# Patient Record
Sex: FEMALE | Race: OTHER | HISPANIC OR LATINO | URBAN - METROPOLITAN AREA
[De-identification: names, ages, dates, MRNs, and addresses within clinical notes are randomized per-mention and may not be internally consistent; named-entity substitution may affect disease eponyms.]

---

## 2017-07-10 ENCOUNTER — EMERGENCY (EMERGENCY)
Facility: HOSPITAL | Age: 42
LOS: 1 days | Discharge: HOME | End: 2017-07-10
Attending: EMERGENCY MEDICINE | Admitting: EMERGENCY MEDICINE
Payer: MEDICAID

## 2017-07-10 VITALS
OXYGEN SATURATION: 96 % | SYSTOLIC BLOOD PRESSURE: 112 MMHG | RESPIRATION RATE: 16 BRPM | TEMPERATURE: 98 F | HEART RATE: 86 BPM | DIASTOLIC BLOOD PRESSURE: 69 MMHG

## 2017-07-10 VITALS
TEMPERATURE: 98 F | SYSTOLIC BLOOD PRESSURE: 107 MMHG | DIASTOLIC BLOOD PRESSURE: 76 MMHG | RESPIRATION RATE: 16 BRPM | HEART RATE: 72 BPM | OXYGEN SATURATION: 99 %

## 2017-07-10 DIAGNOSIS — Z88.5 ALLERGY STATUS TO NARCOTIC AGENT: ICD-10-CM

## 2017-07-10 DIAGNOSIS — J45.909 UNSPECIFIED ASTHMA, UNCOMPLICATED: ICD-10-CM

## 2017-07-10 DIAGNOSIS — Z79.899 OTHER LONG TERM (CURRENT) DRUG THERAPY: ICD-10-CM

## 2017-07-10 PROCEDURE — 99284 EMERGENCY DEPT VISIT MOD MDM: CPT

## 2017-07-10 RX ORDER — ALBUTEROL 90 UG/1
2 AEROSOL, METERED ORAL
Qty: 1 | Refills: 0 | OUTPATIENT
Start: 2017-07-10 | End: 2017-08-09

## 2017-07-10 RX ORDER — ALBUTEROL 90 UG/1
3 AEROSOL, METERED ORAL
Qty: 60 | Refills: 0 | OUTPATIENT
Start: 2017-07-10 | End: 2017-08-09

## 2017-07-10 RX ORDER — ALBUTEROL 90 UG/1
2.5 AEROSOL, METERED ORAL ONCE
Qty: 0 | Refills: 0 | Status: COMPLETED | OUTPATIENT
Start: 2017-07-10 | End: 2017-07-10

## 2017-07-10 RX ORDER — IPRATROPIUM/ALBUTEROL SULFATE 18-103MCG
3 AEROSOL WITH ADAPTER (GRAM) INHALATION
Qty: 0 | Refills: 0 | Status: COMPLETED | OUTPATIENT
Start: 2017-07-10 | End: 2017-07-10

## 2017-07-10 RX ADMIN — Medication 3 MILLILITER(S): at 09:43

## 2017-07-10 RX ADMIN — Medication 20 MILLIGRAM(S): at 09:34

## 2017-07-10 RX ADMIN — Medication 3 MILLILITER(S): at 09:36

## 2017-07-10 RX ADMIN — ALBUTEROL 2.5 MILLIGRAM(S): 90 AEROSOL, METERED ORAL at 10:41

## 2017-07-10 RX ADMIN — ALBUTEROL 2.5 MILLIGRAM(S): 90 AEROSOL, METERED ORAL at 11:12

## 2017-07-10 RX ADMIN — Medication 3 MILLILITER(S): at 09:38

## 2017-07-10 NOTE — ED PROVIDER NOTE - MEDICAL DECISION MAKING DETAILS
mild asthma exacerbation, pt with significantly improved res sx, lungs CTA no wheezing, ambulatory without return of wheezing, stable for dc home

## 2017-07-10 NOTE — ED PROVIDER NOTE - OBJECTIVE STATEMENT
41 y.o. female with asthma exacerbation after dust exposure/cleaning, c/o wheezing and SOB, took prednisone 20mg PTA in ED. 41 y.o. female with asthma exacerbation after dust exposure/cleaning, c/o wheezing and SOB, took prednisone 20mg PTA in ED. Denies fever, chills, palpitations, diaphoresis, DURAN, PND, exertional sx, orthopnea, cough, hemoptysis, peripheral edema, focal weakness, numbness, tingling, paresthesia, HA, dizziness, neck pain, N/V/D/C, abdominal pain, change in urinary/bowel function, trauma, fall, rash, and malaise.

## 2017-07-10 NOTE — ED PROVIDER NOTE - PROGRESS NOTE DETAILS
pt with significantly improved res sx, lungs CTA no wheezing, ambulatory without return of wheezing, stable for dc home

## 2017-10-25 ENCOUNTER — EMERGENCY (EMERGENCY)
Facility: HOSPITAL | Age: 42
LOS: 1 days | Discharge: HOME | End: 2017-10-25
Attending: EMERGENCY MEDICINE | Admitting: EMERGENCY MEDICINE
Payer: MEDICAID

## 2017-10-25 VITALS
OXYGEN SATURATION: 98 % | RESPIRATION RATE: 16 BRPM | TEMPERATURE: 98 F | HEART RATE: 77 BPM | DIASTOLIC BLOOD PRESSURE: 76 MMHG | SYSTOLIC BLOOD PRESSURE: 119 MMHG

## 2017-10-25 DIAGNOSIS — J40 BRONCHITIS, NOT SPECIFIED AS ACUTE OR CHRONIC: ICD-10-CM

## 2017-10-25 DIAGNOSIS — J45.909 UNSPECIFIED ASTHMA, UNCOMPLICATED: ICD-10-CM

## 2017-10-25 DIAGNOSIS — Z79.51 LONG TERM (CURRENT) USE OF INHALED STEROIDS: ICD-10-CM

## 2017-10-25 DIAGNOSIS — Z79.899 OTHER LONG TERM (CURRENT) DRUG THERAPY: ICD-10-CM

## 2017-10-25 DIAGNOSIS — J45.901 UNSPECIFIED ASTHMA WITH (ACUTE) EXACERBATION: ICD-10-CM

## 2017-10-25 PROCEDURE — 99284 EMERGENCY DEPT VISIT MOD MDM: CPT

## 2017-10-25 RX ORDER — IPRATROPIUM/ALBUTEROL SULFATE 18-103MCG
3 AEROSOL WITH ADAPTER (GRAM) INHALATION ONCE
Qty: 0 | Refills: 0 | Status: COMPLETED | OUTPATIENT
Start: 2017-10-25 | End: 2017-10-25

## 2017-10-25 RX ORDER — FLUTICASONE PROPIONATE AND SALMETEROL 50; 250 UG/1; UG/1
1 POWDER ORAL; RESPIRATORY (INHALATION)
Qty: 1 | Refills: 0 | OUTPATIENT
Start: 2017-10-25 | End: 2017-11-24

## 2017-10-25 RX ORDER — AZITHROMYCIN 500 MG/1
500 TABLET, FILM COATED ORAL ONCE
Qty: 0 | Refills: 0 | Status: COMPLETED | OUTPATIENT
Start: 2017-10-25 | End: 2017-10-25

## 2017-10-25 RX ORDER — AZITHROMYCIN 500 MG/1
1 TABLET, FILM COATED ORAL
Qty: 3 | Refills: 0 | OUTPATIENT
Start: 2017-10-25 | End: 2017-10-28

## 2017-10-25 RX ORDER — ALBUTEROL 90 UG/1
2 AEROSOL, METERED ORAL
Qty: 1 | Refills: 0 | OUTPATIENT
Start: 2017-10-25 | End: 2017-11-24

## 2017-10-25 RX ORDER — ALBUTEROL 90 UG/1
1 AEROSOL, METERED ORAL ONCE
Qty: 0 | Refills: 0 | Status: COMPLETED | OUTPATIENT
Start: 2017-10-25 | End: 2017-10-25

## 2017-10-25 RX ADMIN — AZITHROMYCIN 500 MILLIGRAM(S): 500 TABLET, FILM COATED ORAL at 15:34

## 2017-10-25 RX ADMIN — Medication 40 MILLIGRAM(S): at 15:34

## 2017-10-25 RX ADMIN — Medication 3 MILLILITER(S): at 15:34

## 2017-10-25 NOTE — ED PROVIDER NOTE - OBJECTIVE STATEMENT
43 yo F with a hx of asthma presents BIBEMES called by workplace for possible asthma exacerbation today. Pt states that she has been having cough with sputum for the past 2 days. Notes that her daughter recently recovered from bronchitis. Pt states that she was at work today with cough, SOB and nasal congestion. Pt was unsure if her asthma was acting up or if she was sick. Notes that she ran out of her inhaler and is currently in between insurances. Has not been able to get another inhaler. Workplace called EMS as precaution. No fever, no chills, no CP

## 2017-10-25 NOTE — ED ADULT NURSE NOTE - CHPI ED SYMPTOMS NEG
no chills/no body aches/no chest pain/no fever/no headache/no edema/no diaphoresis/no cough/no hemoptysis

## 2017-10-25 NOTE — ED ADULT TRIAGE NOTE - CHIEF COMPLAINT QUOTE
Pt with complaints of asthma exacerbation. Pt sts her inhalers has not been giving her relief. No history of intubation. Pt was given two Combo treatments by paramedics.

## 2024-06-19 NOTE — ED ADULT TRIAGE NOTE - CADM TRG TX PRIOR TO ARRIVAL
Anticoagulation Summary  As of 2024      INR goal:  2.0-3.0   TTR:  70.9% (11.3 y)   INR used for dosin.9 (2024)   Warfarin maintenance plan:  2.5 mg (5 mg x 0.5) every Th; 5 mg (5 mg x 1) all other days   Weekly warfarin total:  32.5 mg   No change documented:  Rosa Isela Ayala RN   Plan last modified:  RosaI sela Ayala RN (2024)   Next INR check:  7/15/2024   Priority:  Follow-Up - 4 Weeks   Target end date:  Indefinite    Indications    Chronic atrial fibrillation (CMD) [I48.20]  Encounter for monitoring Coumadin therapy [Z51.81  Z79.01]  Ischemic cardiomyopathy [I25.5]  Automatic implantable cardioverter-defibrillator Medtronic [Z95.810]  Long term current use of anticoagulant [Z79.01]                 Anticoagulation Episode Summary       INR check location:  Anticoagulation Clinic    Preferred lab:      Send INR reminders to:  [facility] (OPEN ENROLLMENT) [facility]    Comments:  [facility] [address]          Anticoagulation Care Providers       Provider Role Specialty Phone number    Ariel Washington MD Referring Internal Medicine [phone]    Alexys Ward DO Responsible Cardiovascular Disease [phone]            Assessment  Yes No   []  [x] Missed doses:   []  [x] Extra doses:   [x]  [] Significant medication changes (RX, OTC, Herbal):   []  [x] High-risk maintenance medications:                []  [x] Vitamin K / dietary changes    []  [x] Bleeding / bruising:   []  [x] Falls / injury:   []  [x] Acute illness:    []  [x] Alcohol intake:   []  [x] Procedures / hospitalization / ER visits:   []  [x] Other:    Patient came to clinic for anticoagulation monitoring.  Last INR on 6/3/24 was 2.1.  Dose maintained.   Today's INR is 1.9 and is below goal range.    Current warfarin total weekly dose of 32.5 mg verified.  Informed the INR result is below therapeutic range and instructed to maintain current dose per protocol. Discussed dose and return date of 7/15/24 for next INR. See  Anticoagulation flowsheet.    Dr Angelo Marsh is in the office today supervising the treatment.    Call your physician immediately if you notice any of the following symptoms of a blood clot:   Sudden weakness in any limb  Numbness or tingling anywhere  Visual changes or loss of sight in either eye  Sudden onset of slurred speech or inability to speak  Dizziness or faintness  New pain, swelling, redness or heat in any extremity  New SOB or chest pain  Symptoms associated with blood clotting/low INR reviewed and verbalizes understanding.    INR below goal 2.0-3.0    6/17 prednisone (increase) + baclofen (none)    Otherwise no changes per patient    Continue current dose 2.5 mg Th, 5 mg ROW and recheck 4 weeks    Patient declined AVS, has [employer]/appointment card.   2 combi vent treatments